# Patient Record
Sex: MALE | URBAN - METROPOLITAN AREA
[De-identification: names, ages, dates, MRNs, and addresses within clinical notes are randomized per-mention and may not be internally consistent; named-entity substitution may affect disease eponyms.]

---

## 2023-03-21 ENCOUNTER — OFFICE VISIT (OUTPATIENT)
Dept: URBAN - METROPOLITAN AREA CLINIC 42 | Facility: CLINIC | Age: 68
End: 2023-03-21
Payer: MEDICARE

## 2023-03-21 DIAGNOSIS — H25.13 AGE-RELATED NUCLEAR CATARACT, BILATERAL: Primary | ICD-10-CM

## 2023-03-21 DIAGNOSIS — E11.9 TYPE 2 DIABETES MELLITUS WITHOUT COMPLICATIONS: ICD-10-CM

## 2023-03-21 DIAGNOSIS — H43.393 OTHER VITREOUS OPACITIES, BILATERAL: ICD-10-CM

## 2023-03-21 PROCEDURE — 92136 OPHTHALMIC BIOMETRY: CPT | Performed by: OPHTHALMOLOGY

## 2023-03-21 PROCEDURE — 99204 OFFICE O/P NEW MOD 45 MIN: CPT | Performed by: OPHTHALMOLOGY

## 2023-03-21 ASSESSMENT — INTRAOCULAR PRESSURE
OS: 14
OD: 14

## 2023-03-21 ASSESSMENT — PACHYMETRY
OD: 24.38
OS: 24.40
OS: 3.06
OD: 3.07

## 2023-03-21 ASSESSMENT — VISUAL ACUITY
OS: 20/25
OD: 20/40

## 2023-03-21 NOTE — IMPRESSION/PLAN
Impression: Type 2 diabetes mellitus without complications: A70.1. Plan: No background retinopathy, no signs of neovascularization. Discuss ocular and systemic benefits of blood sugar control.

## 2023-03-21 NOTE — IMPRESSION/PLAN
Impression: Age-related nuclear cataract, bilateral: H25.13. Plan: Discussed cataract surgery diagnosis with patient. Discussed risk and benefits Including infection, retinal detachment, droopy eye lid, swelling, bleeding, loss of vision and double vision. Patient understands may still need glasses for best corrected vision. Recommend OD ONLY 
OD AIM: Deonte Cruz Candidate for LenSx, ORA and multifocal IOL. Pt will need gtts upon decision, next appt with counselor. IOL master, Verion, Mainor and OCT were completed today. 
Will monitor cataract OS at this time, no surgical treatment needed at this time

## 2023-07-31 ENCOUNTER — SURGERY (OUTPATIENT)
Dept: URBAN - METROPOLITAN AREA SURGERY 28 | Facility: LOCATION | Age: 68
End: 2023-07-31
Payer: MEDICARE

## 2023-07-31 PROCEDURE — 66984 XCAPSL CTRC RMVL W/O ECP: CPT | Performed by: OPHTHALMOLOGY

## 2023-08-01 ENCOUNTER — POST-OPERATIVE VISIT (OUTPATIENT)
Dept: URBAN - METROPOLITAN AREA CLINIC 42 | Facility: CLINIC | Age: 68
End: 2023-08-01
Payer: MEDICARE

## 2023-08-01 DIAGNOSIS — Z96.1 PRESENCE OF INTRAOCULAR LENS: Primary | ICD-10-CM

## 2023-08-01 PROCEDURE — 99024 POSTOP FOLLOW-UP VISIT: CPT | Performed by: OPHTHALMOLOGY

## 2023-08-01 ASSESSMENT — INTRAOCULAR PRESSURE: OD: 12

## 2023-08-11 ENCOUNTER — POST-OPERATIVE VISIT (OUTPATIENT)
Dept: URBAN - METROPOLITAN AREA CLINIC 42 | Facility: CLINIC | Age: 68
End: 2023-08-11

## 2023-08-11 DIAGNOSIS — Z48.810 ENCOUNTER FOR SURGICAL AFTERCARE FOLLOWING SURGERY ON A SENSE ORGAN: Primary | ICD-10-CM

## 2023-08-11 PROCEDURE — 99024 POSTOP FOLLOW-UP VISIT: CPT | Performed by: OPHTHALMOLOGY

## 2023-08-11 ASSESSMENT — VISUAL ACUITY: OD: 20/40

## 2023-08-11 ASSESSMENT — INTRAOCULAR PRESSURE: OD: 14

## 2023-09-08 ENCOUNTER — POST-OPERATIVE VISIT (OUTPATIENT)
Dept: URBAN - METROPOLITAN AREA CLINIC 42 | Facility: CLINIC | Age: 68
End: 2023-09-08
Payer: MEDICARE

## 2023-09-08 DIAGNOSIS — H52.223 REGULAR ASTIGMATISM, BILATERAL: ICD-10-CM

## 2023-09-08 DIAGNOSIS — Z48.810 ENCOUNTER FOR SURGICAL AFTERCARE FOLLOWING SURGERY ON A SENSE ORGAN: Primary | ICD-10-CM

## 2023-09-08 PROCEDURE — 92015 DETERMINE REFRACTIVE STATE: CPT | Performed by: OPHTHALMOLOGY

## 2023-09-08 PROCEDURE — 99024 POSTOP FOLLOW-UP VISIT: CPT | Performed by: OPHTHALMOLOGY

## 2023-09-08 ASSESSMENT — INTRAOCULAR PRESSURE: OD: 10

## 2023-09-08 ASSESSMENT — VISUAL ACUITY: OD: 20/30

## 2024-10-22 ENCOUNTER — OFFICE VISIT (OUTPATIENT)
Dept: URBAN - METROPOLITAN AREA CLINIC 42 | Facility: CLINIC | Age: 69
End: 2024-10-22
Payer: MEDICARE

## 2024-10-22 DIAGNOSIS — H25.12 AGE-RELATED NUCLEAR CATARACT, LEFT EYE: ICD-10-CM

## 2024-10-22 DIAGNOSIS — H43.393 OTHER VITREOUS OPACITIES, BILATERAL: ICD-10-CM

## 2024-10-22 DIAGNOSIS — H04.123 TEAR FILM INSUFFICIENCY OF BILATERAL LACRIMAL GLANDS: ICD-10-CM

## 2024-10-22 DIAGNOSIS — H35.371 PUCKERING OF MACULA, RIGHT EYE: ICD-10-CM

## 2024-10-22 DIAGNOSIS — E11.9 TYPE 2 DIABETES MELLITUS WITHOUT COMPLICATIONS: Primary | ICD-10-CM

## 2024-10-22 DIAGNOSIS — Z96.1 PRESENCE OF INTRAOCULAR LENS: ICD-10-CM

## 2024-10-22 PROCEDURE — 99204 OFFICE O/P NEW MOD 45 MIN: CPT

## 2024-10-22 PROCEDURE — 92134 CPTRZ OPH DX IMG PST SGM RTA: CPT

## 2024-10-22 RX ORDER — CARBOXYMETHYLCELLULOSE SODIUM, GLYCERIN AND POLYSORBATE 80 5; 10; 5 MG/ML; MG/ML; MG/ML
SOLUTION/ DROPS OPHTHALMIC
Qty: 0 | Refills: 0 | Status: ACTIVE
Start: 2024-10-22

## 2024-10-22 ASSESSMENT — INTRAOCULAR PRESSURE
OD: 14
OS: 14